# Patient Record
Sex: FEMALE | URBAN - NONMETROPOLITAN AREA
[De-identification: names, ages, dates, MRNs, and addresses within clinical notes are randomized per-mention and may not be internally consistent; named-entity substitution may affect disease eponyms.]

---

## 2022-04-27 ENCOUNTER — NURSE TRIAGE (OUTPATIENT)
Dept: CALL CENTER | Facility: HOSPITAL | Age: 22
End: 2022-04-27

## 2022-04-28 NOTE — TELEPHONE ENCOUNTER
Wanting to ask about OTC sinus meds. Advised her to take benadryl tonight and speak with a PCP tomorrow